# Patient Record
Sex: MALE | URBAN - METROPOLITAN AREA
[De-identification: names, ages, dates, MRNs, and addresses within clinical notes are randomized per-mention and may not be internally consistent; named-entity substitution may affect disease eponyms.]

---

## 2021-05-26 ENCOUNTER — PARAMEDICINE (OUTPATIENT)
Dept: OTHER | Age: 71
End: 2021-05-26

## 2021-05-27 NOTE — PROGRESS NOTES
Paramed team went out to visit with pt, as we were knocking on his door, a next door neighbor told us pt had  as of last week.